# Patient Record
Sex: FEMALE | Race: OTHER | NOT HISPANIC OR LATINO
[De-identification: names, ages, dates, MRNs, and addresses within clinical notes are randomized per-mention and may not be internally consistent; named-entity substitution may affect disease eponyms.]

---

## 2019-07-19 PROBLEM — Z00.00 ENCOUNTER FOR PREVENTIVE HEALTH EXAMINATION: Status: ACTIVE | Noted: 2019-07-19

## 2019-07-25 ENCOUNTER — APPOINTMENT (OUTPATIENT)
Dept: BREAST CENTER | Facility: CLINIC | Age: 79
End: 2019-07-25
Payer: MEDICARE

## 2019-07-25 VITALS
BODY MASS INDEX: 33.8 KG/M2 | WEIGHT: 198 LBS | SYSTOLIC BLOOD PRESSURE: 136 MMHG | TEMPERATURE: 98.3 F | HEIGHT: 64 IN | DIASTOLIC BLOOD PRESSURE: 88 MMHG

## 2019-07-25 DIAGNOSIS — M19.90 UNSPECIFIED OSTEOARTHRITIS, UNSPECIFIED SITE: ICD-10-CM

## 2019-07-25 DIAGNOSIS — C50.412 MALIGNANT NEOPLASM OF UPPER-OUTER QUADRANT OF LEFT FEMALE BREAST: ICD-10-CM

## 2019-07-25 DIAGNOSIS — G45.9 TRANSIENT CEREBRAL ISCHEMIC ATTACK, UNSPECIFIED: ICD-10-CM

## 2019-07-25 DIAGNOSIS — Z17.0 MALIGNANT NEOPLASM OF UPPER-OUTER QUADRANT OF RIGHT FEMALE BREAST: ICD-10-CM

## 2019-07-25 DIAGNOSIS — K21.9 GASTRO-ESOPHAGEAL REFLUX DISEASE W/OUT ESOPHAGITIS: ICD-10-CM

## 2019-07-25 DIAGNOSIS — N63.10 UNSPECIFIED LUMP IN THE RIGHT BREAST, UNSPECIFIED QUADRANT: ICD-10-CM

## 2019-07-25 DIAGNOSIS — Z91.09 OTHER ALLERGY STATUS, OTHER THAN TO DRUGS AND BIOLOGICAL SUBSTANCES: ICD-10-CM

## 2019-07-25 DIAGNOSIS — Z17.0 MALIGNANT NEOPLASM OF UPPER-OUTER QUADRANT OF LEFT FEMALE BREAST: ICD-10-CM

## 2019-07-25 DIAGNOSIS — C50.411 MALIGNANT NEOPLASM OF UPPER-OUTER QUADRANT OF RIGHT FEMALE BREAST: ICD-10-CM

## 2019-07-25 DIAGNOSIS — Z86.718 PERSONAL HISTORY OF OTHER VENOUS THROMBOSIS AND EMBOLISM: ICD-10-CM

## 2019-07-25 DIAGNOSIS — Z80.3 FAMILY HISTORY OF MALIGNANT NEOPLASM OF BREAST: ICD-10-CM

## 2019-07-25 PROCEDURE — 99202 OFFICE O/P NEW SF 15 MIN: CPT

## 2019-07-25 RX ORDER — ACETAMINOPHEN 325 MG/1
325 TABLET ORAL
Refills: 0 | Status: ACTIVE | COMMUNITY

## 2019-07-25 RX ORDER — FAMOTIDINE 10 MG/ML
VIAL (ML) INTRAVENOUS
Refills: 0 | Status: ACTIVE | COMMUNITY

## 2019-07-25 NOTE — HISTORY OF PRESENT ILLNESS
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  Left breast 1/4 (+) with ITC 0.1 mm not present on frozen section, with no extranodal extension; 6.5 mm moderately differentiated invasive ductal carcinoma, DCIS solid and cribiform, low grade with negative margins; no LVI or perineural invasion atypical lobular hyperplasia, atypical ductal hyperplasia.  \par \par No chemotherapy.  Patient refused AI.\par \par Right breast intraductal papilloma on ultrasound guided core biopsy 10/21/15; 8:00 periareolar, 6 mm.  \par Status post Right NLOC 2/1/16 demonstrating intraductal papilloma.  \par \par \par Right breast 5 mm cyst 9:00 N6 and right breast benign appearing lymph node 6 mm at 10:00 N8; bilateral breast MRI was recommended however, exam was terminated due to patient only having one kidney.  She was referred here for follow up.  \par

## 2019-07-25 NOTE — ASSESSMENT
[FreeTextEntry1] : Cee is an 79 year old woman with a h/o right breast cancer.  She recently underwent her annual screening which demonstrated a small cyst and benign lymph node in the right breast.  A MRI was recommended which could not be completed.  She has a benign CBE. There are no palpable findings, axillary lymphadenopathy, nipple discharge or inversion. I reviewed her recent imaging. \par \par There is no indication for a breast MRI at this time.  We will schedule her for a right breast ultrasound for short-term f/up in December 2019 and f/up after testing.\par \par I spent a total of 20 minutes of face to face time with this patient, greater than 50% of which was spent in counseling and/or coordination of care. All of her questions were appropriately answered.\par

## 2019-07-25 NOTE — PHYSICAL EXAM
[No Supraclavicular Adenopathy] : no supraclavicular adenopathy [No Cervical Adenopathy] : no cervical adenopathy [Examined in the supine and seated position] : examined in the supine and seated position [No dominant masses] : no dominant masses in right breast  [No dominant masses] : no dominant masses left breast [No Nipple Retraction] : no left nipple retraction [No Nipple Discharge] : no left nipple discharge [No Axillary Lymphadenopathy] : no left axillary lymphadenopathy [No Edema] : no edema [No Swelling] : no swelling [Full ROM] : full range of motion [No Rashes] : no rashes [No Ulceration] : no ulceration

## 2019-07-25 NOTE — CONSULT LETTER
[Dear  ___] : Dear  [unfilled], [Consult Letter:] : I had the pleasure of evaluating your patient, [unfilled]. [Please see my note below.] : Please see my note below. [Consult Closing:] : Thank you very much for allowing me to participate in the care of this patient.  If you have any questions, please do not hesitate to contact me. [Sincerely,] : Sincerely, [FreeTextEntry2] : Roni Latham D.O.\par [facility]\[place] [address]  [FreeTextEntry3] : Ab Ovalles M.D., F.A.C.S.

## 2019-07-25 NOTE — DATA REVIEWED
[FreeTextEntry1] : Bilateral Screening Mammogram 5/15/19\par The breasts are composed of scattered fibroglandular tissue.  No suspicious mass or tumor calcifications are seen in either breast.  There is a subcentimeter nodular focal asymmetric density in the right lateral breast.  There are grossly stable post surgical changes in both breasts.  There are bilateral stable asymmetries.  There are benign appearing calcifications in both breasts.\par Impression:\par Right breast subcentimeter nodular density.  Recommend additional imaging.  Additionally, recommend breast MRI in this high risk patient.  \par BI-RADS 0\par \par \par Diagnostic Mammogram Right breast and Ultrasound 6/20/19\par Additional mammographic views show a persistent 5 mm nodule in the right breast 9:00 N6.  \par Targeted ultrasound shows a corresponding 5 x 6 x 4 mm cyst in the right breast 9:00 N6.  There is a 6 x 6 x 4 mm benign appearing lymph node in the right breast 10:00 N8.  Shadowing in the right breast suggestive of post surgical scarring.\par Impression:\par Persistent subcentimeter right breast nodule on mammogram probably corresponds to a cyst on sonogram.  Shadowing in the right breast 8:00 retroareolar on ultrasound probably represents post surgical change.\par MRI recommended in this high risk patient.\par BI-RADS 0\par

## 2019-07-25 NOTE — REASON FOR VISIT
[Consultation] : a consultation visit [FreeTextEntry1] : Right breast findings on mammogram and ultrasound; history of bilateral breast cancer; PMD is Dr. Latham.

## 2019-07-25 NOTE — PAST MEDICAL HISTORY
[Surgical Menopause] : The patient is in surgical menopause [Menarche Age ____] : age at menarche was [unfilled] [Menopause Age____] : age at menopause was [unfilled] [Total Preg ___] : G[unfilled] [Live Births ___] : P[unfilled]  [Age At Live Birth ___] : Age at live birth: [unfilled] [FreeTextEntry5] : hysterectomy.   [FreeTextEntry6] : none [FreeTextEntry7] : none [FreeTextEntry8] :  for 18 months.